# Patient Record
Sex: FEMALE | Race: WHITE | NOT HISPANIC OR LATINO | Employment: UNEMPLOYED | URBAN - NONMETROPOLITAN AREA
[De-identification: names, ages, dates, MRNs, and addresses within clinical notes are randomized per-mention and may not be internally consistent; named-entity substitution may affect disease eponyms.]

---

## 2019-10-24 ENCOUNTER — OFFICE VISIT (OUTPATIENT)
Dept: CARDIOLOGY | Facility: CLINIC | Age: 39
End: 2019-10-24

## 2019-10-24 VITALS
WEIGHT: 134 LBS | DIASTOLIC BLOOD PRESSURE: 72 MMHG | HEART RATE: 82 BPM | HEIGHT: 62 IN | BODY MASS INDEX: 24.66 KG/M2 | SYSTOLIC BLOOD PRESSURE: 100 MMHG | OXYGEN SATURATION: 99 %

## 2019-10-24 DIAGNOSIS — R07.2 PRECORDIAL PAIN: Primary | ICD-10-CM

## 2019-10-24 DIAGNOSIS — R00.0 TACHYCARDIA: ICD-10-CM

## 2019-10-24 DIAGNOSIS — R06.02 SHORTNESS OF BREATH: ICD-10-CM

## 2019-10-24 PROCEDURE — 99204 OFFICE O/P NEW MOD 45 MIN: CPT | Performed by: PHYSICIAN ASSISTANT

## 2019-10-24 NOTE — PROGRESS NOTES
Subjective   Christiana Joseph is a 39 y.o. female     Chief Complaint   Patient presents with   • Establish Care     patient appears in office today to [facility] cardiac care       HPI    The patient presents in today for initial evaluation.  She is referred mostly in setting of chest pain, but also because of history of dyspnea and tachycardic episodes otherwise.  She denies any specific cardiovascular history.  She has had at least 1-2 episodes where she had to be evaluated for stroke type symptoms.  At that time, CT of the head, eventual MRI the brain, carotid duplex, and work-up otherwise at that time was very much benign.  She follows through neurology with that presentation.  She did see her primary care provider recently and reported episodes of chest discomfort.  Her more significant episode of chest discomfort occurred while at home.  She was at rest watching TV.  She had onset of intense left parasternal pressure.  This had referral to the left neck, left upper extremity, and left scapular area.  This lasted 15 minutes or so it eventually subsided.  She had no fever, chills, or hemoptysis at that time.  She had a mild sensation of palpitations and mild tachycardia noted at that time as well.  She had residual weakness after her chest discomfort resolved and this persisted throughout the afternoon per patient report.  Since that time, she is still had mild episodes of chest tightness.  These tend to occur when exerting.  She will have onset of chest tightness with shortness of air even with low levels of exertion.  She would have onset of tachycardia which will be fairly significant at that time.  She reported all symptoms to her primary care provider and has since been referred for consideration of further evaluation.  She did have an EKG with her primary care provider.  That suggested sinus rhythm without acute changes noted.  Laboratories are pending through her PCP service.    No current outpatient medications  on file.     No current facility-administered medications for this visit.        Prednisone    Past Medical History:   Diagnosis Date   • Asthma    • Colloid cyst of third ventricle (CMS/HCC)        Social History     Socioeconomic History   • Marital status:      Spouse name: Not on file   • Number of children: Not on file   • Years of education: Not on file   • Highest education level: Not on file   Tobacco Use   • Smoking status: Never Smoker   • Smokeless tobacco: Never Used   Substance and Sexual Activity   • Alcohol use: No     Frequency: Never   • Drug use: No   • Sexual activity: Defer       Family History   Problem Relation Age of Onset   • Breast cancer Mother    • Heart disease Father    • Coronary artery disease Father    • Hypertension Father    • Hyperlipidemia Father    • Heart attack Father    • Diabetes Father    • Asthma Father    • Seizures Sister    • Asthma Sister    • Osteoporosis Sister    • No Known Problems Brother        Review of Systems   Constitutional: Positive for fatigue (easily fatigued).   HENT: Negative.  Negative for congestion, rhinorrhea and sneezing.    Eyes: Negative.  Negative for visual disturbance.   Respiratory: Positive for shortness of breath (easily SOA ; worse on exertion ). Negative for cough, chest tightness and wheezing.    Cardiovascular: Positive for chest pain (precordial pain) and palpitations (occasional palpitations/flutters/heart racing). Negative for leg swelling.   Gastrointestinal: Negative.  Negative for abdominal pain, nausea and vomiting.   Endocrine: Negative.  Negative for cold intolerance and heat intolerance.   Genitourinary: Negative.  Negative for difficulty urinating, frequency and urgency.   Musculoskeletal: Negative.  Negative for arthralgias, back pain, neck pain and neck stiffness.   Skin: Negative.  Negative for rash and wound.   Allergic/Immunologic: Positive for food allergies (seafood (but IS NOT allergic to CONTRAST)). Negative  "for environmental allergies.   Neurological: Negative.  Negative for dizziness, syncope, weakness and light-headedness.   Hematological: Negative.  Does not bruise/bleed easily.   Psychiatric/Behavioral: Positive for confusion (easily confused). Negative for agitation and sleep disturbance (denies waking up smothering/SOA). The patient is not nervous/anxious.        Objective     Vitals:    10/24/19 0942   BP: 100/72   BP Location: Left arm   Patient Position: Sitting   Pulse: 82   SpO2: 99%   Weight: 60.8 kg (134 lb)   Height: 157.5 cm (62\")        /72 (BP Location: Left arm, Patient Position: Sitting)   Pulse 82   Ht 157.5 cm (62\")   Wt 60.8 kg (134 lb)   SpO2 99%   BMI 24.51 kg/m²      Lab Results (most recent)     None          Physical Exam   Constitutional: She is oriented to person, place, and time. She appears well-developed and well-nourished. No distress.   HENT:   Head: Normocephalic and atraumatic.   Eyes: Conjunctivae and EOM are normal. Pupils are equal, round, and reactive to light.   Neck: Normal range of motion. Neck supple. No JVD present. No tracheal deviation present.   Cardiovascular: Normal rate, regular rhythm, normal heart sounds and intact distal pulses.   Pulmonary/Chest: Effort normal and breath sounds normal.   Abdominal: Soft. Bowel sounds are normal. She exhibits no distension and no mass. There is no tenderness. There is no rebound and no guarding.   Musculoskeletal: Normal range of motion. She exhibits no edema, tenderness or deformity.   Neurological: She is alert and oriented to person, place, and time.   Skin: Skin is warm and dry. No rash noted. No erythema. No pallor.   Psychiatric: She has a normal mood and affect. Her behavior is normal. Judgment and thought content normal.   Nursing note and vitals reviewed.      Procedure   Procedures         Assessment/Plan      Diagnosis Plan   1. Precordial pain  Adult Transthoracic Echo Complete W/ Cont if Necessary Per " Protocol    Cardiac Event Monitor    Treadmill Stress Test   2. Shortness of breath  Adult Transthoracic Echo Complete W/ Cont if Necessary Per Protocol    Cardiac Event Monitor    Treadmill Stress Test   3. Tachycardia  Adult Transthoracic Echo Complete W/ Cont if Necessary Per Protocol    Cardiac Event Monitor    Treadmill Stress Test     1.  With ongoing episodes of chest tightness, as outlined above, we will schedule for regular treadmill stress test.  She needs her a stratification in that regard.    2.  We will schedule for an echo as well to evaluate her structurally, in particular given symptoms all as above.    3.  Because of episodes of tachycardia and intermittent palpitations associated with her chest tightness, I will schedule the patient for an event monitor to screen for dysrhythmic substrates.    4.  For now, I will make no changes in medical regimen.  We will see her back to review results of the above and recommended further at that time.  She will call for any complications prior to follow-up.            Patient's Body mass index is 24.51 kg/m². BMI is within normal parameters. No follow-up required..           Electronically signed by:

## 2019-10-30 ENCOUNTER — APPOINTMENT (OUTPATIENT)
Dept: CARDIOLOGY | Facility: HOSPITAL | Age: 39
End: 2019-10-30